# Patient Record
Sex: FEMALE | Race: WHITE | Employment: UNEMPLOYED | URBAN - METROPOLITAN AREA
[De-identification: names, ages, dates, MRNs, and addresses within clinical notes are randomized per-mention and may not be internally consistent; named-entity substitution may affect disease eponyms.]

---

## 2020-07-29 ENCOUNTER — OFFICE VISIT (OUTPATIENT)
Dept: PRIMARY CARE CLINIC | Age: 35
End: 2020-07-29
Payer: COMMERCIAL

## 2020-07-29 ENCOUNTER — HOSPITAL ENCOUNTER (OUTPATIENT)
Age: 35
Setting detail: SPECIMEN
Discharge: HOME | End: 2020-07-29
Payer: COMMERCIAL

## 2020-07-29 VITALS
SYSTOLIC BLOOD PRESSURE: 143 MMHG | HEART RATE: 101 BPM | OXYGEN SATURATION: 96 % | TEMPERATURE: 100 F | DIASTOLIC BLOOD PRESSURE: 91 MMHG

## 2020-07-29 PROCEDURE — 99202 OFFICE O/P NEW SF 15 MIN: CPT | Performed by: INTERNAL MEDICINE

## 2020-07-29 ASSESSMENT — PATIENT HEALTH QUESTIONNAIRE - PHQ9
SUM OF ALL RESPONSES TO PHQ QUESTIONS 1-9: 0
1. LITTLE INTEREST OR PLEASURE IN DOING THINGS: 0
SUM OF ALL RESPONSES TO PHQ QUESTIONS 1-9: 0
2. FEELING DOWN, DEPRESSED OR HOPELESS: 0
SUM OF ALL RESPONSES TO PHQ9 QUESTIONS 1 & 2: 0

## 2020-07-29 ASSESSMENT — ENCOUNTER SYMPTOMS
FLU SYMPTOMS: 1
SORE THROAT: 1

## 2020-07-29 NOTE — PROGRESS NOTES
[address] [address]  Dept: [phone]  Dept Fax: [phone]    Michael Amato is a 28 y.o. female who presents to the urgent care today for her medicalconditions/complaints as noted below. Michael Amato is c/o of Covid Testing (pt c/o diarrhea and chest pain.)      HPI:     Influenza   This is a new problem. The current episode started in the past 7 days. The problem occurs constantly. The problem has been unchanged. Associated symptoms include chest pain and a sore throat. Associated symptoms comments: diarrhea. Nothing aggravates the symptoms. She has tried nothing for the symptoms. The treatment provided no relief. Exposed to person testing positive for COVID at work. No past medical history on file. No current outpatient medications on file. No current facility-administered medications for this visit. No Known Allergies    Health Maintenance   Topic Date Due    Varicella vaccine (1 of 2 - 2-dose childhood series) 01/29/1986    HIV screen  01/29/2000    DTaP/Tdap/Td vaccine (1 - Tdap) 01/29/2004    Cervical cancer screen  01/29/2006    Flu vaccine (1) 09/01/2020    Hepatitis A vaccine  Aged Out    Hepatitis B vaccine  Aged Out    Hib vaccine  Aged Out    Meningococcal (ACWY) vaccine  Aged Out    Pneumococcal 0-64 years Vaccine  Aged Out       Subjective:      Review of Systems   HENT: Positive for sore throat. Cardiovascular: Positive for chest pain. All other systems reviewed and are negative. Objective:     Physical Exam  Vitals signs reviewed. Constitutional:       Appearance: Normal appearance. HENT:      Head: Normocephalic and atraumatic. Skin:     General: Skin is warm and dry. Neurological:      General: No focal deficit present. Mental Status: She is alert and oriented to person, place, and time.    Psychiatric:         Mood and Affect: Mood normal.         Behavior: Behavior normal.       BP (!) 143/91 (Site: Right Lower Arm, Position: Sitting, Cuff Size: Large Adult)   Pulse 101   Temp 100 °F (37.8 °C) (Oral)   SpO2 96%       Assessment:       Diagnosis Orders   1. Flu-like symptoms  COVID-19 Ambulatory       Plan:      No follow-ups on file. No orders of the defined types were placed in this encounter. Orders Placed This Encounter   Procedures    COVID-19 Ambulatory     Standing Status:   Future     Standing Expiration Date:   7/29/2021     Scheduling Instructions:      Saline media preferred given current shortage of viral transport media but both acceptable     Order Specific Question:   Status     Answer:   Symptomatic/Infection Suspected    Droplet Plus Isolation - (Use only for COVID-19)     Standing Status:   Standing     Number of Occurrences:   1            Patient given educational materials - see patient instructions. Discussed use, benefit, and side effects of prescribed medications. All patientquestions answered. Pt voiced understanding.     Electronically signed by Dimas Cline MD on 7/29/2020at 3:57 PM

## 2020-08-01 LAB — SARS-COV-2, NAA: NOT DETECTED

## 2020-10-14 ENCOUNTER — OFFICE VISIT (OUTPATIENT)
Dept: PRIMARY CARE CLINIC | Age: 35
End: 2020-10-14
Payer: COMMERCIAL

## 2020-10-14 VITALS
WEIGHT: 267.2 LBS | DIASTOLIC BLOOD PRESSURE: 82 MMHG | OXYGEN SATURATION: 98 % | SYSTOLIC BLOOD PRESSURE: 121 MMHG | BODY MASS INDEX: 40.5 KG/M2 | HEIGHT: 68 IN | HEART RATE: 89 BPM | TEMPERATURE: 97.3 F

## 2020-10-14 PROBLEM — F41.9 ANXIETY: Status: ACTIVE | Noted: 2019-06-20

## 2020-10-14 PROBLEM — E66.812 CLASS 2 OBESITY DUE TO EXCESS CALORIES WITHOUT SERIOUS COMORBIDITY WITH BODY MASS INDEX (BMI) OF 39.0 TO 39.9 IN ADULT: Status: ACTIVE | Noted: 2019-06-20

## 2020-10-14 PROBLEM — E66.09 CLASS 2 OBESITY DUE TO EXCESS CALORIES WITHOUT SERIOUS COMORBIDITY WITH BODY MASS INDEX (BMI) OF 39.0 TO 39.9 IN ADULT: Status: ACTIVE | Noted: 2019-06-20

## 2020-10-14 PROBLEM — R45.89 SYMPTOMS OF DEPRESSION: Status: ACTIVE | Noted: 2019-06-20

## 2020-10-14 PROCEDURE — 99204 OFFICE O/P NEW MOD 45 MIN: CPT | Performed by: NURSE PRACTITIONER

## 2020-10-14 RX ORDER — SERTRALINE HYDROCHLORIDE 25 MG/1
25 TABLET, FILM COATED ORAL DAILY
Qty: 30 TABLET | Refills: 3 | Status: SHIPPED | OUTPATIENT
Start: 2020-10-14

## 2020-10-14 RX ORDER — OMEPRAZOLE 20 MG/1
20 CAPSULE, DELAYED RELEASE ORAL
Qty: 90 CAPSULE | Refills: 1 | Status: SHIPPED | OUTPATIENT
Start: 2020-10-14

## 2020-10-14 ASSESSMENT — ENCOUNTER SYMPTOMS
ABDOMINAL PAIN: 1
RHINORRHEA: 0
CHEST TIGHTNESS: 0
DIARRHEA: 1
SORE THROAT: 0
VOMITING: 0
NAUSEA: 0
COLOR CHANGE: 0
SHORTNESS OF BREATH: 0

## 2020-10-14 ASSESSMENT — PATIENT HEALTH QUESTIONNAIRE - PHQ9
SUM OF ALL RESPONSES TO PHQ QUESTIONS 1-9: 2
2. FEELING DOWN, DEPRESSED OR HOPELESS: 1
SUM OF ALL RESPONSES TO PHQ QUESTIONS 1-9: 2
SUM OF ALL RESPONSES TO PHQ QUESTIONS 1-9: 2
1. LITTLE INTEREST OR PLEASURE IN DOING THINGS: 1
SUM OF ALL RESPONSES TO PHQ9 QUESTIONS 1 & 2: 2

## 2020-10-14 NOTE — PROGRESS NOTES
[address] PRIMARY CARE  [address]  [address] [address]  Dept: [phone]  Dept Fax: [phone]    Konrad Valero is a 28 y.o. female who presentstoday for her medical conditions/complaints as noted below.   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[employer], employed through [employer]  Had a physical through [address] clinic, did not have labs  No significant PMH, no daily medications  Vision and dental exam UTD, no issues    Has complaint of intermittent abdominal pain and diarrhea  No constipation, never has to stratin for BMs, stool is soft, note watery, would not take shape of container  Had CT abdomen last year at [facility], was normal, no fever or chills, no blood in urine or stool, no melena  Symptoms not irritated by any certain food but has never kept food journal or paid that close of attention  Is obese, currently doing Weight Watcher's in attempt to lose weight, generally eats what she wants when she wants  Does have some GERD symptoms, drinks diet cola, does not eat late, no nausea or vomiting    Due for pap, periods regular, bleeding lasts 5-6 days  No hx of abnormal pap or HPV  Would like to see GYN    Has concern for inattentiveness, this has been chronic issue for her  When she was younger, she had difficulty in school and parents wrote it off  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, frequently losing things, has concern for ADHD, feels like she is day dreaming    Has had some increased feelings of depression in last year which is new for her  Has lack of motivation and enthusiasm lately, still able to perform ADLs  Feels like she's holding a lot in, feels like this has been worse due to Covid   works a lot, not from around here, found herself feeling isolated which is worsening feelings  Is tearful in office, is currently on menses, thinks that's contributing  Denies any hx of suicidal thoughts or attempts      No results found for: LABA1C          ( goal A1C is < 7)   No results found for: LABMICR  No results found for: LDLCHOLESTEROL, LDLCALC    (goal LDL is <100)   No results found for: AST, ALT, BUN  BP Readings from Last 3 Encounters:   10/14/20 121/82   20 (!) 143/91          ([id number])    History reviewed. No pertinent past medical history. History reviewed. No pertinent surgical history. Family History   Problem Relation Age of Onset    Stroke Mother     Other Mother         ADD    No Known Problems Father        Social History     Tobacco Use    Smoking status: Former Smoker     Years: 6.00     Types: Cigarettes     Last attempt to quit:      Years since quittin.7    Smokeless tobacco: Never Used   Substance Use Topics    Alcohol use: Yes     Comment: occasionally      Current Outpatient Medications   Medication Sig Dispense Refill    omeprazole (PRILOSEC) 20 MG delayed release capsule Take 1 capsule by mouth every morning (before breakfast) 90 capsule 1    sertraline (ZOLOFT) 25 MG tablet Take 1 tablet by mouth daily 30 tablet 3     No current facility-administered medications for this visit.       No Known Allergies    Health Maintenance   Topic Date Due    Varicella vaccine (1 of 2 - 2-dose childhood series) 1986    HIV screen  2000    DTaP/Tdap/Td vaccine (1 - Tdap) 2004    Cervical cancer screen  2006    Flu vaccine (1) 2020    Hepatitis A vaccine  Aged Out    Hepatitis B vaccine  Aged Out    Hib vaccine  Aged Out    Meningococcal (ACWY) vaccine  Aged Out    Pneumococcal 0-64 years Vaccine  Aged Out       Subjective:      Review of Systems   Constitutional: Negative for activity warm and dry. Capillary Refill: Capillary refill takes less than 2 seconds. Neurological:      Mental Status: She is alert and oriented to person, place, and time. Psychiatric:         Mood and Affect: Mood normal.         Behavior: Behavior normal.           :       Diagnosis Orders   1. Gastroesophageal reflux disease without esophagitis  omeprazole (PRILOSEC) 20 MG delayed release capsule   2. Diarrhea, unspecified type  omeprazole (PRILOSEC) 20 MG delayed release capsule   3. Depression with anxiety  sertraline (ZOLOFT) 25 MG tablet    Cony Underwood MD, Psychiatry, [facility]   4. Screening, anemia, deficiency, iron  CBC Auto Differential   5. Screening for diabetes mellitus  Comprehensive Metabolic Panel   6. Screening for cardiovascular condition  Lipid Panel   7. Screening for thyroid disorder  TSH with Reflex   8. Encounter for gynecological examination with Papanicolaou smear of cervix  Jody - Jeremy Toussaint, CNP, Gynecology, [place]             :          1. Gastroesophageal reflux disease without esophagitis  New, will trial PPI daily, discussed avoidance of triggers and not eating at least 1-2 hours before laying down    - omeprazole (PRILOSEC) 20 MG delayed release capsule; Take 1 capsule by mouth every morning (before breakfast)  Dispense: 90 capsule; Refill: 1    2. Diarrhea, unspecified type  Waxing and waning, will request [facility] ED encounter, labs and imaging    - omeprazole (PRILOSEC) 20 MG delayed release capsule; Take 1 capsule by mouth every morning (before breakfast)  Dispense: 90 capsule; Refill: 1    3. Depression with anxiety  New, worsening, PSY referral for eval for ADHD and depression with anxiety, start SSRI and recheck in 3 weeks. Discussed adverse reactions    - sertraline (ZOLOFT) 25 MG tablet; Take 1 tablet by mouth daily  Dispense: 30 tablet; Refill: 3  - Cony Underwood MD, Psychiatry, [place]    4.  Screening, anemia, deficiency, iron  - CBC Auto Differential; Future    5. Screening for diabetes mellitus  - Comprehensive Metabolic Panel; Future    6. Screening for cardiovascular condition  - Lipid Panel; Future    7. Screening for thyroid disorder  - TSH with Reflex; Future    8. Encounter for gynecological examination with Papanicolaou smear of cervix  - [place], [place], Gynecology, [place]      Return in about 3 weeks (around 11/4/2020) for Depression/Anxiety. Patient given educational materials - see patient instructions. Discussed use, benefit, and side effects of prescribed medications. All patient questions answered. Pt voiced understanding. Reviewed health maintenance. Instructed to continue current medications, diet and exercise. Patient agreed with treatment plan. Follow up as directed.        Electronicallysigned by VERENICE Lazar CNP on 10/14/2020 at 4:00 PM

## 2020-10-25 LAB
ABSOLUTE BASO #: 0 X10E9/L (ref 0–0.2)
ABSOLUTE EOS #: 0.1 X10E9/L (ref 0–0.4)
ABSOLUTE LYMPH #: 1.2 X10E9/L (ref 1–3.5)
ABSOLUTE MONO #: 0.4 X10E9/L (ref 0–0.9)
ABSOLUTE NEUT #: 3.7 X10E9/L (ref 1.5–6.6)
ALBUMIN SERPL-MCNC: 4.1 G/DL (ref 3.2–5.3)
ALK PHOSPHATASE: 55 U/L (ref 39–130)
ALT SERPL-CCNC: 15 U/L (ref 0–31)
ANION GAP SERPL CALCULATED.3IONS-SCNC: 6 MMOL/L (ref 5–15)
AST SERPL-CCNC: 15 U/L (ref 0–41)
BASOPHILS RELATIVE PERCENT: 0.7 %
BILIRUB SERPL-MCNC: 0.3 MG/DL (ref 0.3–1.2)
BUN BLDV-MCNC: 14 MG/DL (ref 5–23)
CALCIUM SERPL-MCNC: 9.4 MG/DL (ref 8.5–10.5)
CHLORIDE BLD-SCNC: 106 MMOL/L (ref 98–109)
CHOLESTEROL/HDL RATIO: 4.6 (ref 1–5)
CHOLESTEROL: 158 MG/DL (ref 150–200)
CO2: 28 MMOL/L (ref 22–32)
CREAT SERPL-MCNC: 0.78 MG/DL (ref 0.4–1)
EGFR AFRICAN AMERICAN: >60 ML/MIN/1.73SQ.M
EGFR IF NONAFRICAN AMERICAN: >60 ML/MIN/1.73SQ.M
EOSINOPHILS RELATIVE PERCENT: 1.2 %
GLUCOSE: 81 MG/DL (ref 65–99)
HCT VFR BLD CALC: 41 % (ref 35–47)
HDLC SERPL-MCNC: 34 MG/DL
HEMOGLOBIN: 13.9 G/DL (ref 11.7–15.5)
LDL CHOLESTEROL CALCULATED: 97 MG/DL
LDL/HDL RATIO: 2.9
LYMPHOCYTE %: 22.6 %
MCH RBC QN AUTO: 29.1 PG (ref 27–34)
MCHC RBC AUTO-ENTMCNC: 34 G/DL (ref 32–36)
MCV RBC AUTO: 86 FL (ref 80–100)
MONOCYTES # BLD: 6.6 %
NEUTROPHILS RELATIVE PERCENT: 68.9 %
PDW BLD-RTO: 14.4 % (ref 11.5–15)
PLATELETS: 209 X10E9/L (ref 150–450)
PMV BLD AUTO: 8.8 FL (ref 7–12)
POTASSIUM SERPL-SCNC: 4.3 MMOL/L (ref 3.5–5)
RBC: 4.79 X10E12/L (ref 3.8–5.2)
SODIUM BLD-SCNC: 140 MMOL/L (ref 134–146)
TOTAL PROTEIN: 7.4 G/DL (ref 6–8)
TRIGL SERPL-MCNC: 133 MG/DL (ref 27–150)
TSH SERPL DL<=0.05 MIU/L-ACNC: 1.2 UIU/ML (ref 0.49–4.67)
VLDLC SERPL CALC-MCNC: 27 MG/DL (ref 0–30)
WBC: 5.4 X10E9/L (ref 4–11)

## 2020-11-04 ENCOUNTER — OFFICE VISIT (OUTPATIENT)
Dept: PRIMARY CARE CLINIC | Age: 35
End: 2020-11-04
Payer: COMMERCIAL

## 2020-11-04 VITALS
DIASTOLIC BLOOD PRESSURE: 80 MMHG | HEART RATE: 80 BPM | OXYGEN SATURATION: 99 % | BODY MASS INDEX: 39.08 KG/M2 | WEIGHT: 257 LBS | SYSTOLIC BLOOD PRESSURE: 120 MMHG

## 2020-11-04 PROCEDURE — 99213 OFFICE O/P EST LOW 20 MIN: CPT | Performed by: NURSE PRACTITIONER

## 2020-11-04 ASSESSMENT — ENCOUNTER SYMPTOMS
VOMITING: 0
SHORTNESS OF BREATH: 0
NAUSEA: 0
RHINORRHEA: 0
ABDOMINAL PAIN: 0
CHEST TIGHTNESS: 0
SORE THROAT: 0
COLOR CHANGE: 0
DIARRHEA: 0

## 2020-11-04 NOTE — PROGRESS NOTES
[address] [facility] PRIMARY CARE  [facility] [address] [facility] [address]  [address] [address]  Dept: [phone]  Dept Fax: [phone]    Meera Luu is a 28 y.o. female who presentstoday for her medical conditions/complaints as noted below. Meera Luu is c/o of  Chief Complaint   Patient presents with    Depression         HPI:     Here for 3 week recheck of depression and anxiety, has been on zoloft 25mg daily  Feels like she is less emotional but otherwise hasn't noticed much, started taking the medication 2 weeks ago, denies SI/HI  Had lab work done, WNL, reviewed and discussed  Will schedule with GYN for pap  Continues to have inattentiveness when working, feels that she has just learned to deal with it, may consider seeing PSY for potential ADHD and mediations if needed, it's not problematic for her at present  Continues to do Weight Watcher's- lost 10lbs since last visit- congratulated!!  Reports the she can feel a difference in her clothes and feels better overall with healthier eating      No results found for: LABA1C          ( goal A1C is < 7)   No results found for: LABMICR  LDL Calculated (mg/dL)   Date Value   10/24/2020 97       (goal LDL is <100)   AST (U/L)   Date Value   10/24/2020 15     ALT (U/L)   Date Value   10/24/2020 15     BUN (mg/dL)   Date Value   10/24/2020 14     BP Readings from Last 3 Encounters:   20 120/80   10/14/20 121/82   20 (!) 143/91          ([id number])    No past medical history on file. No past surgical history on file.     Family History   Problem Relation Age of Onset    Stroke Mother     Other Mother         ADD    No Known Problems Father        Social History     Tobacco Use    Smoking status: Former Smoker     Years: 6.00     Types: Cigarettes     Last attempt to quit: 2015     Years since quittin.8    Smokeless tobacco: Never Used   Substance Use Topics    Alcohol use: Yes     Comment: occasionally      Current Outpatient Medications   Medication Sig Dispense Refill    omeprazole (PRILOSEC) 20 MG delayed release capsule Take 1 capsule by mouth every morning (before breakfast) 90 capsule 1    sertraline (ZOLOFT) 25 MG tablet Take 1 tablet by mouth daily 30 tablet 3     No current facility-administered medications for this visit. No Known Allergies    Health Maintenance   Topic Date Due    Varicella vaccine (1 of 2 - 2-dose childhood series) 01/29/1986    HIV screen  01/29/2000    DTaP/Tdap/Td vaccine (1 - Tdap) 01/29/2004    Cervical cancer screen  01/29/2006    Flu vaccine  Completed    Hepatitis A vaccine  Aged Out    Hepatitis B vaccine  Aged Out    Hib vaccine  Aged Out    Meningococcal (ACWY) vaccine  Aged Out    Pneumococcal 0-64 years Vaccine  Aged Out       Subjective:      Review of Systems   Constitutional: Negative for activity change, fatigue and fever. HENT: Negative for congestion, rhinorrhea and sore throat. Eyes: Negative for visual disturbance. Respiratory: Negative for chest tightness and shortness of breath. Cardiovascular: Negative for chest pain and palpitations. Gastrointestinal: Negative for abdominal pain, diarrhea, nausea and vomiting. Endocrine: Negative for polydipsia. Genitourinary: Negative for difficulty urinating. Musculoskeletal: Negative for arthralgias and myalgias. Skin: Negative for color change. Neurological: Negative for weakness and headaches. Hematological: Negative for adenopathy. Psychiatric/Behavioral: Negative for behavioral problems, self-injury, sleep disturbance and suicidal ideas. The patient is not nervous/anxious. All other systems reviewed and are negative. Objective:   /80 (Site: Right Upper Arm, Position: Sitting)   Pulse 80   Wt 257 lb (116.6 kg)   LMP 10/13/2020 (Exact Date)   SpO2 99%   BMI 39.08 kg/m²   Physical Exam  Vitals signs reviewed. Constitutional:       General: She is not in acute distress. Appearance: Normal appearance. HENT:      Head: Normocephalic. Eyes:      Pupils: Pupils are equal, round, and reactive to light. Cardiovascular:      Rate and Rhythm: Normal rate and regular rhythm. Pulses: Normal pulses. Heart sounds: Normal heart sounds. Pulmonary:      Effort: Pulmonary effort is normal.      Breath sounds: Normal breath sounds. Skin:     General: Skin is warm and dry. Neurological:      Mental Status: She is alert and oriented to person, place, and time. Psychiatric:         Attention and Perception: Attention normal.         Mood and Affect: Mood normal.         Speech: Speech normal.         Behavior: Behavior normal.         Thought Content: Thought content normal.         Cognition and Memory: Cognition normal.         Judgment: Judgment normal.           :       Diagnosis Orders   1. Anxiety     2. Symptoms of depression     3. Class 2 obesity due to excess calories without serious comorbidity with body mass index (BMI) of 39.0 to 39.9 in adult               :          1. Anxiety  2. Symptoms of depression  Improving, will continue with zoloft 25mg daily and increase if she feels there is room for improvement in 4-6 weeks. 3. Class 2 obesity due to excess calories without serious comorbidity with body mass index (BMI) of 39.0 to 39.9 in adult  Improving, down 10lbs, continue healthy diet and increased activity      Return in about 6 weeks (around 12/16/2020) for Depression/Anxiety. Patient given educational materials - see patient instructions. Discussed use, benefit, and side effects of prescribed medications. All patient questions answered. Pt voiced understanding. Reviewed health maintenance. Instructed to continue current medications, diet and exercise. Patient agreed with treatment plan. Follow up as directed.        Electronicallysigned by VERENICE Sanders CNP on 11/4/2020 at 3:56 PM

## 2020-11-21 ENCOUNTER — HOSPITAL ENCOUNTER (OUTPATIENT)
Age: 35
Setting detail: SPECIMEN
Discharge: HOME | End: 2020-11-21
Payer: COMMERCIAL

## 2020-11-21 ENCOUNTER — OFFICE VISIT (OUTPATIENT)
Dept: PRIMARY CARE CLINIC | Age: 35
End: 2020-11-21
Payer: COMMERCIAL

## 2020-11-21 VITALS
SYSTOLIC BLOOD PRESSURE: 120 MMHG | DIASTOLIC BLOOD PRESSURE: 82 MMHG | WEIGHT: 230 LBS | OXYGEN SATURATION: 99 % | TEMPERATURE: 98.8 F | BODY MASS INDEX: 34.97 KG/M2 | HEART RATE: 76 BPM

## 2020-11-21 PROCEDURE — 99213 OFFICE O/P EST LOW 20 MIN: CPT | Performed by: FAMILY MEDICINE

## 2020-11-21 ASSESSMENT — ENCOUNTER SYMPTOMS
SORE THROAT: 0
NAUSEA: 0
WHEEZING: 0
DIARRHEA: 1
STRIDOR: 0
BLOOD IN STOOL: 0
COUGH: 0
SHORTNESS OF BREATH: 0
VOMITING: 0
SWOLLEN GLANDS: 0
ABDOMINAL PAIN: 0
TROUBLE SWALLOWING: 0

## 2020-11-21 NOTE — LETTER
[address] [place] [address]  [address]  [address] [place] [address]  Dept: [phone]  Dept Fax: [phone]    Anuj Jessica MD        11/21/20    Patient: Brenda Das   YOB: 1985           To Whom it May Concern:    Brenda Das was seen in the clinic on 11/21/20. The patient is having Covid testing processed and should remain in isolation for a 2 week period. If you have any questions or concerns, please don't hesitate to call. Sincerely,     .     Dr. Anuj Jessica

## 2020-11-21 NOTE — PROGRESS NOTES
Polo Alexis MD  [address] [facility] [address] [address]  Dept: [phone]    Jordan Marin is a 28 y.o. female who presents today for her medical conditions/complaints as noted below. Jordan Marin is here today c/o Concern For COVID-19 (fatigue, diarrhea, loss of appetite headaches )       HPI:     URI    This is a new problem. The current episode started yesterday. There has been no fever. Associated symptoms include diarrhea. Pertinent negatives include no abdominal pain, chest pain, coughing, ear pain, nausea, rash, sore throat, swollen glands, vomiting or wheezing. She has tried nothing for the symptoms. Patient presents to the walk-in clinic today with her  and son for evaluation of her URI symptoms  Symptoms started the day before yesterday, endorses fatigue and diarrhea, Tmax at home 99.8 °F  Diarrhea - ongoing still, no blood, occurs every 3 hours or so, small amounts, no N/V, able to tolerate p.o. fluids  No chance of pregnancy  No abdo pain  Works as  at [employer], had potential Covid exposures from students with symptoms who were not tested for Covid   Denies difficulty breathing or wheezing or cough    Patient Active Problem List   Diagnosis    Anxiety    Class 2 obesity due to excess calories without serious comorbidity with body mass index (BMI) of 39.0 to 39.9 in adult    Symptoms of depression       No past medical history on file. No past surgical history on file.   Family History   Problem Relation Age of Onset    Stroke Mother     Other Mother         ADD    No Known Problems Father      Social History     Tobacco Use    Smoking status: Former Smoker     Years: 6.00     Types: Cigarettes     Last attempt to quit:      Years since quittin.8    Smokeless tobacco: Never Used   Substance Use Topics    Alcohol use: Yes     Comment: occasionally    Drug use: Never       Current Outpatient Medications:     omeprazole (PRILOSEC) 20 MG delayed release capsule, Take 1 capsule by mouth every morning (before breakfast), Disp: 90 capsule, Rfl: 1    sertraline (ZOLOFT) 25 MG tablet, Take 1 tablet by mouth daily, Disp: 30 tablet, Rfl: 3    Subjective:     Review of Systems   Constitutional: Positive for fatigue. Negative for fever. HENT: Negative for ear pain, sore throat and trouble swallowing. Respiratory: Negative for cough, shortness of breath, wheezing and stridor. Cardiovascular: Negative for chest pain. Gastrointestinal: Positive for diarrhea. Negative for abdominal pain, blood in stool, nausea and vomiting. Skin: Negative for rash. Objective:     /82   Pulse 76   Temp 98.8 °F (37.1 °C) (Oral)   Wt 230 lb (104.3 kg)   BMI 34.97 kg/m²     Physical Exam  Constitutional:       Appearance: Normal appearance. She is well-developed. She is not ill-appearing, toxic-appearing or diaphoretic. HENT:      Head: Normocephalic. Eyes:      General:         Right eye: No discharge. Left eye: No discharge. Extraocular Movements: Extraocular movements intact. Conjunctiva/sclera: Conjunctivae normal.   Neck:      Musculoskeletal: No muscular tenderness. Cardiovascular:      Rate and Rhythm: Normal rate and regular rhythm. Heart sounds: Normal heart sounds. No murmur. Pulmonary:      Effort: Pulmonary effort is normal. No respiratory distress. Breath sounds: Normal breath sounds. No wheezing. Comments: Oxygen saturation at 99% on room air while the patient was marching in place and singing the ABCs. Abdominal:      General: There is no distension. Palpations: Abdomen is soft. Tenderness: There is no abdominal tenderness. There is no guarding or rebound. Lymphadenopathy:      Cervical: No cervical adenopathy. Neurological:      Mental Status: She is alert.    Psychiatric:         Mood and Affect: Mood normal.         Behavior: Behavior normal.         Thought Content: Thought content normal.         Judgment: Judgment normal.         Assessment & Plan:      1. Viral illness  Discussed importance of fluid intake the diarrhea, discussed red flags for diarrhea. Covid swab taken. Recommended isolation pending covid results. Discussed treatment regimen to include rest, hydration, tylenol prn. Discussed deep breathing exercises. Discussed to monitor for progression of symptoms. Follow up as needed. - COVID-19 Ambulatory; Future    Call or return to clinic prn if these symptoms worsen or fail to improve as anticipated. I have reviewed the instructions with the patient, answering all questions to their satisfaction.     Electronically signed by Edu Valderrama MD on 11/21/2020 at 1:33 PM

## 2020-11-25 LAB — SARS-COV-2, NAA: NOT DETECTED

## 2025-07-16 ENCOUNTER — TRANSCRIBE ORDERS (OUTPATIENT)
Dept: ADMINISTRATIVE | Age: 40
End: 2025-07-16

## 2025-07-16 DIAGNOSIS — Z12.31 ENCOUNTER FOR SCREENING MAMMOGRAM FOR MALIGNANT NEOPLASM OF BREAST: Primary | ICD-10-CM

## 2025-07-24 ENCOUNTER — TELEPHONE (OUTPATIENT)
Dept: OBGYN CLINIC | Age: 40
End: 2025-07-24

## 2025-07-24 NOTE — TELEPHONE ENCOUNTER
1st Attempt    LVM for pt to call office to schedule referral     Thank you    Pt need 60 min appointment, Please Do Not Cut in Half.